# Patient Record
Sex: FEMALE | Race: WHITE | NOT HISPANIC OR LATINO | URBAN - NONMETROPOLITAN AREA
[De-identification: names, ages, dates, MRNs, and addresses within clinical notes are randomized per-mention and may not be internally consistent; named-entity substitution may affect disease eponyms.]

---

## 2018-05-30 ENCOUNTER — OFFICE (OUTPATIENT)
Dept: URBAN - NONMETROPOLITAN AREA CLINIC 13 | Facility: CLINIC | Age: 63
End: 2018-05-30

## 2018-05-30 VITALS
WEIGHT: 150 LBS | DIASTOLIC BLOOD PRESSURE: 71 MMHG | SYSTOLIC BLOOD PRESSURE: 107 MMHG | HEART RATE: 86 BPM | HEIGHT: 68 IN

## 2018-05-30 VITALS — HEIGHT: 68 IN

## 2018-05-30 DIAGNOSIS — R10.11 RIGHT UPPER QUADRANT PAIN: ICD-10-CM

## 2018-05-30 DIAGNOSIS — R10.12 LEFT UPPER QUADRANT PAIN: ICD-10-CM

## 2018-05-30 DIAGNOSIS — K59.00 CONSTIPATION, UNSPECIFIED: ICD-10-CM

## 2018-05-30 DIAGNOSIS — Z12.11 ENCOUNTER FOR SCREENING FOR MALIGNANT NEOPLASM OF COLON: ICD-10-CM

## 2018-05-30 LAB
AMYLASE: 32 U/L (ref 25–125)
CBC SYSMEX: HEMATOCRIT: 42.9 % (ref 37–47)
CBC SYSMEX: HEMOGLOBIN: 13.8 G/DL (ref 12–14)
CBC SYSMEX: LYMPHS %: 41 % — HIGH (ref 20.5–40.5)
CBC SYSMEX: LYMPHS ABSOLUTE: 3.1 10*3U/L — HIGH (ref 1.3–2.9)
CBC SYSMEX: MCH: 27.3 PG (ref 27–32)
CBC SYSMEX: MCHC: 32.2 G/DL (ref 28.5–35)
CBC SYSMEX: MCV: 84.8 FL (ref 84–100)
CBC SYSMEX: MONOS %: 9.4 % (ref 2–10)
CBC SYSMEX: MONOS ABSOLUTE: 0.7 10*3U/L (ref 0.3–3.8)
CBC SYSMEX: MPV: 9.5 FL (ref 7.4–10.4)
CBC SYSMEX: NEUT. %: 49.6 % (ref 43–67)
CBC SYSMEX: NEUT. ABSOLUTE: 3.7 10*3U/L (ref 2.2–4.8)
CBC SYSMEX: PLATELETS: 262 10*3U/L (ref 130–440)
CBC SYSMEX: RBC: 5.1 10*6U/L (ref 4.2–5.4)
CBC SYSMEX: RDW: 12.9 % (ref 11.5–15.5)
CBC SYSMEX: WBC: 7.5 10*3U/L (ref 4.5–10.5)
COMPLETE METABOLIC: ALBUMIN: 4.6 G/DL (ref 3.5–5.2)
COMPLETE METABOLIC: ALK. PHOS: 73 U/L (ref 40–150)
COMPLETE METABOLIC: ALT: 18 U/L (ref 0–55)
COMPLETE METABOLIC: AST: 26 U/L (ref 5–34)
COMPLETE METABOLIC: BUN: 6 MG/DL — LOW (ref 7–26)
COMPLETE METABOLIC: CALCIUM: 9.7 MG/DL (ref 8.4–10.2)
COMPLETE METABOLIC: CHLORIDE: 100 MMOL/L (ref 98–107)
COMPLETE METABOLIC: CO2: 25 MEQ/L (ref 22–29)
COMPLETE METABOLIC: CREATININE: 0.75 MG/DL (ref 0.57–1.25)
COMPLETE METABOLIC: GFR - AFRICAN AMERICAN: 100.5 (ref 59–200)
COMPLETE METABOLIC: GFR - NON AFRICAN AMERICAN: 83 (ref 59–200)
COMPLETE METABOLIC: GLUCOSE: 102 MG/DL — HIGH (ref 70–99)
COMPLETE METABOLIC: POTASSIUM: 4.1 MMOL/L (ref 3.5–5.3)
COMPLETE METABOLIC: SODIUM: 137 MMOL/L (ref 136–145)
COMPLETE METABOLIC: TOTAL BILIRUBIN: 0.4 MG/DL (ref 0.2–1.2)
COMPLETE METABOLIC: TOTAL PROTEIN: 6.9 G/DL (ref 6.4–8.3)
LIPASE: 52 U/L (ref 8–78)

## 2018-05-30 PROCEDURE — 80053 COMPREHEN METABOLIC PANEL: CPT

## 2018-05-30 PROCEDURE — 82150 ASSAY OF AMYLASE: CPT

## 2018-05-30 PROCEDURE — 83690 ASSAY OF LIPASE: CPT

## 2018-05-30 PROCEDURE — 99203 OFFICE O/P NEW LOW 30 MIN: CPT

## 2018-05-30 PROCEDURE — 85025 COMPLETE CBC W/AUTO DIFF WBC: CPT

## 2018-05-31 ENCOUNTER — OFFICE (OUTPATIENT)
Dept: URBAN - NONMETROPOLITAN AREA CLINIC 13 | Facility: CLINIC | Age: 63
End: 2018-05-31

## 2018-05-31 DIAGNOSIS — K59.00 CONSTIPATION, UNSPECIFIED: ICD-10-CM

## 2018-05-31 PROCEDURE — HYDRO: HCPCS

## 2018-06-01 ENCOUNTER — OFFICE (OUTPATIENT)
Dept: URBAN - NONMETROPOLITAN AREA CLINIC 13 | Facility: CLINIC | Age: 63
End: 2018-06-01

## 2018-06-01 VITALS — HEIGHT: 68 IN

## 2018-06-01 DIAGNOSIS — K59.00 CONSTIPATION, UNSPECIFIED: ICD-10-CM

## 2018-06-01 PROCEDURE — 82274 ASSAY TEST FOR BLOOD FECAL: CPT

## 2018-06-06 ENCOUNTER — OFFICE (OUTPATIENT)
Dept: URBAN - NONMETROPOLITAN AREA CLINIC 13 | Facility: CLINIC | Age: 63
End: 2018-06-06

## 2018-06-06 VITALS
HEART RATE: 95 BPM | DIASTOLIC BLOOD PRESSURE: 70 MMHG | SYSTOLIC BLOOD PRESSURE: 110 MMHG | WEIGHT: 149 LBS | HEIGHT: 68 IN

## 2018-06-06 DIAGNOSIS — K59.00 CONSTIPATION, UNSPECIFIED: ICD-10-CM

## 2018-06-06 DIAGNOSIS — R10.12 LEFT UPPER QUADRANT PAIN: ICD-10-CM

## 2018-06-06 PROCEDURE — 99213 OFFICE O/P EST LOW 20 MIN: CPT

## 2023-01-24 ENCOUNTER — OFFICE (OUTPATIENT)
Dept: URBAN - NONMETROPOLITAN AREA CLINIC 1 | Facility: CLINIC | Age: 68
End: 2023-01-24

## 2023-01-24 VITALS — SYSTOLIC BLOOD PRESSURE: 124 MMHG | HEIGHT: 68 IN | DIASTOLIC BLOOD PRESSURE: 76 MMHG

## 2023-01-24 DIAGNOSIS — K57.90 DIVERTICULOSIS OF INTESTINE, PART UNSPECIFIED, WITHOUT PERFO: ICD-10-CM

## 2023-01-24 DIAGNOSIS — R19.4 CHANGE IN BOWEL HABIT: ICD-10-CM

## 2023-01-24 PROCEDURE — 99203 OFFICE O/P NEW LOW 30 MIN: CPT | Performed by: INTERNAL MEDICINE

## 2023-01-24 NOTE — SERVICEHPINOTES
67-year-old presents with complaints of change in bowel habits. She reports a sexual began around the beginning of COVID. She was having alternating diarrhea and constipation. She has been able to improve some of this with some as needed use of MiraLAX for constipation. She is try to increase her fiber intake. She drinks plenty of water. She reports of occasional left upper quadrant right upper quadrant pain with associated constipation improved with bowel movements. Denies rectal bleeding or melena. Denies unintentional weight loss. Denies any significant upper GI complaintsbr
[facility] GI procedure historybrColonoscopy September [address] Richard. Normal terminal ileum. Moderately severe diverticulosis in the sigmoid colon. Patient was recommended for repeat colonoscopy in 8 to 10 years

## 2023-03-13 ENCOUNTER — ON CAMPUS - OUTPATIENT (OUTPATIENT)
Dept: URBAN - NONMETROPOLITAN AREA HOSPITAL 34 | Facility: HOSPITAL | Age: 68
End: 2023-03-13
Payer: COMMERCIAL

## 2023-03-13 DIAGNOSIS — Z12.11 ENCOUNTER FOR SCREENING FOR MALIGNANT NEOPLASM OF COLON: ICD-10-CM

## 2023-03-13 DIAGNOSIS — D12.5 BENIGN NEOPLASM OF SIGMOID COLON: ICD-10-CM

## 2023-03-13 PROCEDURE — 45380 COLONOSCOPY AND BIOPSY: CPT | Mod: PT | Performed by: INTERNAL MEDICINE
